# Patient Record
(demographics unavailable — no encounter records)

---

## 2025-03-04 NOTE — PHYSICAL EXAM
[General Appearance - Alert] : alert [General Appearance - In No Acute Distress] : in no acute distress [Oriented To Time, Place, And Person] : oriented to person, place, and time [Impaired Insight] : insight and judgment were intact [Affect] : the affect was normal [Sclera] : the sclera and conjunctiva were normal [Extraocular Movements] : extraocular movements were intact [PERRL With Normal Accommodation] : pupils were equal in size, round, reactive to light, with normal accommodation [Outer Ear] : the ears and nose were normal in appearance [Oropharynx] : the oropharynx was normal [Neck Appearance] : the appearance of the neck was normal [Neck Cervical Mass (___cm)] : no neck mass was observed [Thyroid Diffuse Enlargement] : the thyroid was not enlarged [Jugular Venous Distention Increased] : there was no jugular-venous distention [Thyroid Nodule] : there were no palpable thyroid nodules [Auscultation Breath Sounds / Voice Sounds] : lungs were clear to auscultation bilaterally [Heart Rate And Rhythm] : heart rate was normal and rhythm regular [Heart Sounds Gallop] : no gallops [Heart Sounds] : normal S1 and S2 [Murmurs] : no murmurs [Heart Sounds Pericardial Friction Rub] : no pericardial rub [Arterial Pulses Carotid] : carotid pulses were normal with no bruits [Veins - Varicosity Changes] : there were no varicosital changes [Edema] : there was no peripheral edema [No CVA Tenderness] : no ~M costovertebral angle tenderness [No Spinal Tenderness] : no spinal tenderness [Abnormal Walk] : normal gait [Nail Clubbing] : no clubbing  or cyanosis of the fingernails [Musculoskeletal - Swelling] : no joint swelling seen [Motor Tone] : muscle strength and tone were normal [Skin Turgor] : normal skin turgor [Skin Color & Pigmentation] : normal skin color and pigmentation [] : no rash [FreeTextEntry1] : Examination.  She looked generally well. Mental status was intact.  She was alert, attentive and fully oriented.  Speech was fluent and comprehension intact. Fund of knowledge was normal    On cranial nerve exam , there was flattening of the Right nasolabial fold but her smile was symmetric. Otherwise, cranial nerves two through 12 were intact.  On motor exam tone was normal.  There was no drift. There was a very mild postural tremor of her arms. Fine finger movements were intact. There was moderately diminished range of motion at the left shoulder with the left deltoid 3/5.   Otherwise power was normal throughout.  Reflexes were 2+ to 3+ in the arms and at the knees, 1+ to 2+ at the left ankle, and 2+ at the right ankle.  Plantar reflexes were silent.  Coordination and gait were normal.  Tandem gait was normal.  Romberg's test was negative.  Sensory exam was intact all modalities.

## 2025-03-04 NOTE — DISCUSSION/SUMMARY
[FreeTextEntry1] : Impression.   Previous Summary:  Overall she is neurologically intact.  In mid 3/09 she developed aphasia with word finding difficulty and possible paraphasias.  Brain imaging showed left MCA infarction.  CTA (3/19/09), carotid Doppler (3/7/09) and MRA (6/15/09) showed a beadlike appearance in the ICAs most consistent with fibromuscular dysplasia, and less likely with atherosclerosis, other forms of vasculopathy associated with collagen vascular disease or vasospasm.  Interestingly, the ICA stenosis initially appeared to be more severe on the right (about 60%) which was asymptomatic, than on the left (about 50%).  I suspect that she does have fibromuscular dysplasia.  There was no evidence of dissection, but she might have had superimposed thrombosis and subsequent artery to artery embolism producing her left MCA infarct. JOSELYN (8/12/09) was normal..  Repeat carotid Doppler has shown stable bilateral carotid stenosis, neither side of hemodynamic significance.  These stenoses are currently asymptomatic and there is no role for revascularization.   I defer to your judgment as to whether the thyroid nodule, incidentally found on Doppler, warrants further investigation.    9/8/14 : She continues to do well neurologically and is intact.   9/15/15.  She Is neurologically stable and essentially intact.  Her Doppler studies are stable without hemodynamically significant stenosis bilaterally.  We Discussed the importance of exercise and lifestyle modification for secondary stroke prevention.  9/12/16.She is neurologically stable and intact. Her Doppler studies are stable, showing right carotid stenosis of approximately 40% and left carotid stenosis of approximately 55%, both vessels currently asymptomatic.  9/19/17.. She is neurologically stable and doing well.  Her Doppler studies are stable, showing right carotid stenosis of approximately 40% and left carotid stenosis of approximately 50%, both vessels currently asymptomatic.  3/12/19.Overall she is neurologically stable and doing well. She Should Benefit from ongoing management of her vascular risk factors She will continue Plavix.  2/3/20.  Overall she is neurologically stable and doing well.  Her Asymptomatic Right Carotid Stenosis has progressed slightly to about 50%, still of no clinical significance. She should benefit from ongoing, careful surveillance and management of any vascular risk factors (including lipid profile, etc.).  1/25/2021. She is neurologically stable and doing very well.  She has bilateral currently asymptomatic carotid stenosis of approximately 50%. She should benefit from ongoing management of vascular risk factors.  3/10/22  Overall she is neurologically stable and doing very well.  Her repeat dopplers on 1/4/22 were stable, demonstrating bilateral carotid stenosis of approximately 50%; there is no role for revascularization.  She should continue to benefit from aggressive vascular risk factor control.   2/27/2023  Overall she is neurologically stable and doing very well.  Repeat Carotid Doppler on 2/24/23 was stable, demonstrating bilateral carotid stenosis of approximately 50%; there is no role for revascularization. Her heart rate was significantly irregular and may reflect paroxysmal atrial fibrillation versus other arrhythmia. She is pending the results of her two week Zio patch.  She currently has bilateral, asymptomatic carotid stenosis of approximately 50%.  This represents an extremely low risk for atherosclerotic stroke.  The decision about whether she should be on an cholesterol lowering agent, should be based on her 10 year ASCVD risk, and not on the degree of carotid stenosis.  2/5/24  - Overall she is neurologically stable and doing very well. - Repeat Dopplers on 1/31/24 were stable, demonstrating bilateral carotid stenosis of approximately 50%; there is no role for revascularization.  - Following last visit, she had a 2 week Zio patch which did not demonstrate evidence of atrial fibrillation.  - She should continue to benefit from aggressive vascular risk factor control.  3/4/2025  - Overall she is neurologically stable and doing very well. - She occasionally feels dizzy when she bends her head forward, and this resolves when she straightens her head.  This likely represents peripheral vestibular dysfunction (not central).  She will monitor it, and if she notices any worsening/changes, we can consider an ENT referral/or vestibular therapy. - Repeat Dopplers on 1/31/24 were stable, demonstrating bilateral carotid stenosis of approximately 50%; there is no role for revascularization.  -She should continue to take Plavix for secondary stroke prevention.  She should follow up in 1 year with repeat carotid and transcranial Dopplers.  I hope that she remains free of serious trouble.

## 2025-03-04 NOTE — HISTORY OF PRESENT ILLNESS
[FreeTextEntry1] : 77-year-old right-handed  lady first evaluated on 7/29/09 for sudden aphasia in mid 3/09.  On 3/12/09, during the evening, she felt off balance, and also noted that the television "was not making sense".  On 3/13/09, while talking to a friend, she noted transient word finding difficulty or perhaps using the wrong word.  On 3/14/09 she again noted word finding difficulty or perhaps speaking "gibberish".  She was admitted to [facility] and diagnosed with stroke (Dr. Hernandez, neurologist).  In the hospital, she had an additional episode of worsening aphasia, lasting minutes.  Since her hospitalization, however, she has been feeling well, although she does note occasional mild word finding difficulty.  CT head (3/19/09) showed subacute left MCA infarction involving the frontal lobe, parietal lobe and insula.  CTA neck (3/19/09) was limited by dental artifact, but showed "lobulation" involving the ICAs, consistent with FMD, although collagen vascular disease, atypical atherosclerosis or vasospasm could not be ruled out. CTA brain (3/19/09) was normal. Carotid Doppler (3/7/09) showed moderate-severe heterogeneous plaque, possibly combined with fibromuscular dysplasia on the right, with 60-80% stenosis by velocity criteria, 60% diameter reduction.  Right external carotid artery stenosis was noted.  On the left there was moderate heterogeneous plaque combined with probable FMD, with 40-60% stenosis by velocity criteria, 50% diameter reduction.  MRI brain (6/15/09 -- compared to 3/15/09) to my eye showed appropriate evolution of her previous left MCA infarct, (now with areas of gliosis) with several small components, involving the posterior insula, temporal operculum, and temporoparietal cortical/subcortical region.  MRA neck (6/15/09) to my eye showed improved signal in the proximal right ICA at the previously noted area of stenosis.  There was irregularity again seen in the midportion of both ICAs with a beadlike appearance, consistent with fibromuscular dysplasia versus atherosclerosis (but atypical in location for atherosclerosis).  MRA brain (6/15/09) was normal, showing a fetal origin of left PCA.  Holter (3/16/09) showed sinus rhythm, one VPC, 12 APCs, two runs of SVT.  Collagen vascular screen (3/14/09) was negative. Repeat carotid Doppler (7/29/09) showed mild heterogeneous plaque on the right with less than 40% stenosis.  On the left there was moderate heterogeneous plaque with stenosis of 40 to 60 % by velocity criteria.  The left ICA was tortuous.  The extracranial vertebral arteries showed anterograde flow with a normal resistance pattern.  Transcranial Doppler (7/29/09) of the [place] was normal with no sign of stenosis.  We were unable to insonate the right PCA or proximal left PCA.  Her Doppler studies (7/29/09) showed improvement in the asymptomatic right ICA "stenosis" compared to her study of 3/09, as had been documented on her MRA of 6/09, perhaps implying lysis of a superimposed thrombus.  Her left ICA stenosis was stable at approximately 50%. JOSELYN (8/12/09) was normal. Next evaluated on 8/24/10 reporting only rare, mild word finding difficulty when she was under stress. Repeat carotid Doppler (8/24/10) showed moderate heterogeneous plaque on the right with 40-60% stenosis by velocity criteria, 50% diameter reduction. On the left there was moderate heterogeneous plaque with 40-60% stenosis by velocity criteria. Measurement of diameter reduction suggested more severe stenosis on the left, but I suspect in this case that measurement by velocity criteria is more accurate. This Doppler showed mild progression of right carotid stenosis compared to her study of 7/09, although still overall improved compared to her study of 3/09. There was no significant change in left carotid stenosis compared to her study of 7/09. Repeat transcranial Doppler (8/24/10) of the [place] Next evaluated on 9/9/11 reporting only rare word finding difficulty which was chronic and stable. She had been having occasional headaches, usually associated with attempts to taper her prednisone (for polymyalgia rheumatica). Repeat carotid Doppler (9/9/11) showed mild heterogeneous plaque on the right with less than 40% stenosis. On the left there was moderate heterogeneous plaque with 40-60% stenosis by velocity criteria. The left ICA was tortuous. This Doppler showed no significant change compared to her study of 8/10 (possibly slightly improved on the right, but this may be due to  variability). Repeat transcranial Doppler (9/9/11) of the [place] was normal with no sign of stenosis. The distal basilar artery was technically difficult to insonate. We were unable to insonate the distal left MCA.    9/9/13.. repeat carotid Doppler (9/9/13) showed mild heterogeneous plaque on the right with less than 40% stenosis. On the left there was mild homogeneous plaque in the CCA with less than 40% stenosis, and moderate heterogeneous plaque in the bulb and ICA with 40-60% stenosis by velocity criteria, 60% diameter reduction. The ICAs were tortuous bilaterally. The extracranial vertebral arteries showed antegrade flow with a normal resistance pattern. Incidentally noted was a right thyroid nodule. this Doppler showed no significant change compared to her study of 9/11 except that a right thyroid nodule was detected on the current exam. Repeat transcranial Doppler (9/9/13) of the [place] was normal with no sign of stenosis. This TCD showed no significant change compared to her study of 9/11 .   9/8/14  : She reports no new or recurrent focal neurologic symptoms.   repeat carotid Doppler (9/8/14) showed mild heterogeneous plaque on the right with less than 40% stenosis. On the left there was moderate heterogeneous plaque with 40-60% stenosis by velocity criteria.  A right thyroid nodule was again imaged. The extracranial right vertebral artery showed antegrade flow with a normal resistance pattern. We were unable to image the extracranial left vertebral artery. This Doppler showed no significant change compared to her study of 9/13, allowing for some technical difficulty. Repeat transcranial Doppler (9/8/14) of the [place] was normal with no sign of stenosis. We were unable to insonate the left MCA. This TCD showed no significant change compared to her study of 9/13.  9/15/15. She Reports no new or recurrent focal neurologic symptoms. Repeat Carotid Doppler (9/8/15) showed mild heterogeneous plaque on the right with less than 40% stenosis. On the left there was moderate heterogeneous plaque with 40-60% stenosis by velocity criteria. The extracranial vertebral arteries showed antegrade flow with a normal resistance pattern. This Doppler showed no significant change compared to her study of 9/14.  Repeat transcranial Doppler (9/8/15) of the [place] was normal with no sign of stenosis. This TCD showed no significant change compared to her study of 9/14.  9/12/16. She came to the office today. Since her last visit, she has had no new or recurrent focal neurologic symptoms.  Repeat Carotid Doppler (9/12/16) showed mild heterogeneous plaque on the right with less than 40% stenosis.  The right ICA was tortuous. On the left there was moderate heterogeneous plaque with 40-60% stenosis by velocity criteria, 55% diameter reduction.  The Left ICA was markedly tortuous. This Doppler showed no significant change compared to her study of 9/15.  Repeat transcranial Doppler (99/12/16) of the [place] was normal with no sign of stenosis. This TCD showed no significant change compared to her study of 99/15.  9/19/17. She came to the office today. She has had no new focal neurologic symptoms.  Repeat carotid Doppler (9/11/17) showed mild heterogeneous plaque on the right with approximately 40% stenosis. On the left there was moderate heterogeneous plaque with 40-60% stenosis by velocity criteria, 50% diameter reduction. The left ICA was tortuous. This Doppler showed no significant change compared to her study of 9/16.  Repeat transcranial Doppler (9/11/17) of the [place] was normal with no sign of stenosis. The study was technically difficult.  3/12/19. She Came to the Office Today. She reports no new or recurrent focal neurologic symptoms.  Repeat carotid Doppler (11/12/18) showed mild heterogeneous plaque on the right with approximately 40% stenosis. On the left there was moderate heterogeneous plaque with 40-60% stenosis by velocity criteria, 50% diameter reduction. The left ICA was tortuous. This Doppler showed no significant change compared to her study of 9/17.  Repeat transcranial Doppler (11/12/18) of the [place] was normal with no sign of stenosis. The study was technically difficult. This TCD showed no significant change compared to the prior study of 9/17.  2/3/20.  She came to the office today.  She reports no new focal neurologic symptoms.  Repeat carotid Doppler (1/27/20) showed moderate heterogeneous plaque on the right with 40-60% stenosis by velocity criteria, 50% diameter reduction. On the left there was moderate heterogeneous plaque with 40-60% stenosis by velocity criteria, 50% diameter reduction. This Doppler showed a mild increase in Right ICA stenosis compared to the prior study of 11/18.  Repeat transcranial Doppler (1/27/20) of the [place] was normal with no sign of stenosis.  1/25/2021. She came to the office today.  She has had no new or recurrent focal neurologic symptoms.  Repeat carotid Doppler (1/11/2021) showed moderate heterogeneous plaque on the right with 40-60% stenosis by velocity criteria, 50% diameter reduction. On the left there was moderate heterogeneous plaque with 40-60% stenosis by velocity criteria, 50% diameter reduction. This Doppler showed no significant change compared to the prior study of 1/20.  Repeat transcranial Doppler (1/11/2021) of the [place] was normal with no sign of stenosis.  3/10/22 She presented to the office today. She has had no new or recurrent focal neurologic symptoms.  Repeat carotid Doppler (1/4/2022) showed moderate heterogeneous plaque on the right with 40-60% stenosis by velocity criteria, 50% diameter reduction. On the left there was moderate heterogeneous plaque with 40-60% stenosis by velocity criteria, 50% diameter reduction. This Doppler showed no significant change compared to the prior study of 1/21.  Repeat transcranial Doppler (1/4/2022) of the [place] was normal with no sign of stenosis. No significant change compared to the prior study of 1/21.  2/27/2023 She presents to the office today. On her Doppler exam on 1/24/23, an irregular heart rate was incidentally detected. She recently had a 2 week zio patch to screen for occult AF; results pending. She has had no new or recurrent focal neurologic symptoms.  Repeat carotid Doppler (1/24/2023) showed moderate heterogeneous plaque on the right with 40-60% stenosis by velocity criteria, 50% diameter reduction. On the left there was moderate heterogeneous plaque with 40-60% stenosis by velocity criteria, 50% diameter reduction. Heart rate was noted to be irregular. No significant change versus 1/22 except that an irregular heart rate was detected on current exam.   2/5/24 She presents to the office today. She has had no new or recurrent focal neurologic symptoms.  Repeat carotid Doppler (1/31/2024) showed moderate heterogeneous plaque on the right with 40-60% stenosis by velocity criteria, 50% diameter reduction. On the left there was moderate heterogeneous plaque with 40-60% stenosis by velocity criteria, 50% diameter reduction. No significant change versus 1/23 except that an irregular heart rate was not detected on current exam.  Repeat transcranial Doppler (1/31/2024) of the [place] was normal with no sign of stenosis.   3/4/25 She presents to the office today.  She notes that occasionally, when she bends over, she feels a little dizzy.  She denies any other new focal neurologic symptoms.  Carotid Doppler 1/31/25: Moderate 40-60% stenosis by velocity criteria and 50% diameter reduction in the right internal carotid artery.Moderate 40-60% stenosis by velocity criteria and 50% diameter reduction in the left internal carotid artery.The left ICA is markedly tortuous.No significant stenosis in the external carotid arteries bilaterally.Antegrade flow in both vertebral arteries with normal flow resistance. Plaque Morphology: Heterogeneous plaque in the bulb and right ICA.Heterogeneous plaque in the bulb and left ICA. No significant change vs. prior exam in 02/24.  TCD: Normal.

## 2025-03-04 NOTE — CONSULT LETTER
[Dear  ___] : Dear  [unfilled], [Consult Letter:] : I had the pleasure of evaluating your patient, [unfilled]. [Please see my note below.] : Please see my note below. [Sincerely,] : Sincerely, [DrApolinar  ___] : Dr. GUZMAN [Richard B. Libman, MD, FRCP(C)] : Richard B. Libman, MD, FRCP(C) [Chief, Vascular Neurology] : Chief, Vascular Neurology [Director of Stroke Service, [facility]] : Director of Stroke Service, [facility] [Professor of Neurology] : Professor of Neurology [[facility]] : [facility] [FreeTextEntry2] : PCP Dr. Carol Kirkpatrick Cardiologist: Dr. Evan Qureshi  [FreeTextEntry3] : Marlin Saldaña, FNP-BC

## 2025-03-04 NOTE — REVIEW OF SYSTEMS
[As Noted in HPI] : as noted in HPI [Joint Pain] : joint pain [Limb Pain] : limb pain [Negative] : Heme/Lymph [FreeTextEntry2] : Covid in December 2023 [FreeTextEntry9] : Persistent left shoulder weakness without pain after rotator cuff surgery